# Patient Record
Sex: MALE | Race: BLACK OR AFRICAN AMERICAN | URBAN - METROPOLITAN AREA
[De-identification: names, ages, dates, MRNs, and addresses within clinical notes are randomized per-mention and may not be internally consistent; named-entity substitution may affect disease eponyms.]

---

## 2019-01-08 ENCOUNTER — EMERGENCY (EMERGENCY)
Facility: HOSPITAL | Age: 29
LOS: 1 days | Discharge: HOME | End: 2019-01-08
Attending: EMERGENCY MEDICINE | Admitting: EMERGENCY MEDICINE
Payer: SELF-PAY

## 2019-01-08 VITALS
HEART RATE: 64 BPM | SYSTOLIC BLOOD PRESSURE: 148 MMHG | RESPIRATION RATE: 18 BRPM | OXYGEN SATURATION: 100 % | TEMPERATURE: 99 F | DIASTOLIC BLOOD PRESSURE: 94 MMHG

## 2019-01-08 VITALS
HEART RATE: 59 BPM | RESPIRATION RATE: 18 BRPM | SYSTOLIC BLOOD PRESSURE: 144 MMHG | TEMPERATURE: 99 F | OXYGEN SATURATION: 99 % | WEIGHT: 195.11 LBS | DIASTOLIC BLOOD PRESSURE: 101 MMHG
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
## 2019-01-08 DIAGNOSIS — M25.512 PAIN IN LEFT SHOULDER: ICD-10-CM

## 2019-01-08 DIAGNOSIS — S40.012A CONTUSION OF LEFT SHOULDER, INITIAL ENCOUNTER: ICD-10-CM

## 2019-01-08 PROCEDURE — 99283 EMERGENCY DEPT VISIT LOW MDM: CPT

## 2019-01-08 PROCEDURE — 73030 X-RAY EXAM OF SHOULDER: CPT

## 2019-01-08 PROCEDURE — 73030 X-RAY EXAM OF SHOULDER: CPT | Mod: 26

## 2019-01-08 PROCEDURE — 73020 X-RAY EXAM OF SHOULDER: CPT

## 2019-01-08 PROCEDURE — 73020 X-RAY EXAM OF SHOULDER: CPT | Mod: 26,LT

## 2019-01-08 RX ORDER — OXYCODONE AND ACETAMINOPHEN 5; 325 MG/1; MG/1
1 TABLET ORAL ONCE
Qty: 0 | Refills: 0 | Status: DISCONTINUED | OUTPATIENT
Start: 2019-01-08 | End: 2019-01-08

## 2019-01-08 RX ADMIN — OXYCODONE AND ACETAMINOPHEN 1 TABLET(S): 5; 325 TABLET ORAL at 14:39

## 2019-01-08 NOTE — ED PROVIDER NOTE - CARE PROVIDER_API CALL
Jose Ramon Tee), Orthopaedic Surgery  [address] [address]  Phone: [phone]  Fax: [phone]

## 2019-01-08 NOTE — ED PROVIDER NOTE - MEDICAL DECISION MAKING DETAILS
Pt with left shoulder pain s/p door hitting shoulder, PE - bruise to anterior shoulder but able to range and touch opposite shoulder with some pain, no neurovascular deficit on exam, xrays reviewed with Dr. Jamison who state no dislocation at this time.  Recommend RICE, pt placed in sling and pt to follow up with ortho.

## 2019-01-08 NOTE — ED PROVIDER NOTE - MUSCULOSKELETAL, MLM
left upper ext - (+) bruise to anterior shoulder, with LROM at shoulder secondary to pain, appears deformed, FROM at elbow and wrist, no distal nv deficit, lateral sensation intact

## 2019-01-08 NOTE — ED ADULT NURSE NOTE - OBJECTIVE STATEMENT
Pt presents to ED with c/o L shoulder injury sustained when someone at work opened a door hitting his L shoulder. Bruising noted, distal pulses present, pain worse when lifting arm.

## 2019-01-08 NOTE — ED ADULT NURSE NOTE - NSIMPLEMENTINTERV_GEN_ALL_ED
Implemented All Universal Safety Interventions:  [place] to call system. Call bell, personal items and telephone within reach. Instruct patient to call for assistance. Room bathroom lighting operational. Non-slip footwear when patient is off stretcher. Physically safe environment: no spills, clutter or unnecessary equipment. Stretcher in lowest position, wheels locked, appropriate side rails in place.

## 2019-01-08 NOTE — ED PROVIDER NOTE - NSFOLLOWUPINSTRUCTIONS_ED_ALL_ED_FT
Take ibuprofen and percocet as prescribed.    Follow up with orthopedist recommended below.    Shoulder Sprain  A shoulder sprain is a partial or complete tear in one of the tough, fiber-like tissues (ligaments) in the shoulder. The ligaments in the shoulder help to hold the shoulder in place.    What are the causes?  This condition may be caused by:    A fall.  A hit to the shoulder.  A twist of the arm.    What increases the risk?  This condition is more likely to develop in:    People who play sports.  People who have problems with balance or coordination.    What are the signs or symptoms?  Symptoms of this condition include:    Pain when moving the shoulder.  Limited ability to move the shoulder.  Swelling and tenderness on top of the shoulder.  Warmth in the shoulder.  A change in the shape of the shoulder.  Redness or bruising on the shoulder.    How is this diagnosed?  This condition is diagnosed with a physical exam. During the exam, you may be asked to do simple exercises with your shoulder. You may also have imaging tests, such as X-rays, MRI, or a CT scan. These tests can show how severe the sprain is.    How is this treated?  This condition may be treated with:    Rest.  Pain medicine.  Ice.  A sling or brace. This is used to keep the arm still while the shoulder is healing.  Physical therapy or rehabilitation exercises. These help to improve the range of motion and strength of the shoulder.  Surgery (rare). Surgery may be needed if the sprain caused a joint to become unstable. Surgery may also be needed to reduce pain.    Some people may develop ongoing shoulder pain or lose some range of motion in the shoulder. However, most people do not develop long-term problems.    Follow these instructions at home:  ImageRest.  Ask your health care provider when it is safe for you to drive if you have a sling or brace on your shoulder.  Take over-the-counter and prescription medicines only as told by your health care provider.  If directed, apply ice to the area:    Put ice in a plastic bag.  Place a towel between your skin and the bag.  Leave the ice on for 20 minutes, 2–3 times per day.    If you were given a shoulder sling or brace:    Wear it as told.  Remove it to shower or bathe.  Move your arm only as much as told by your health care provider, but keep your hand moving to prevent swelling.    If you were shown how to do any exercises, do them as told by your health care provider.  Keep all follow-up visits as told by your health care provider. This is important.  Contact a health care provider if:  Your pain gets worse.  Your pain is not relieved with medicines.  You have increased redness or swelling.  Get help right away if:  You have a fever.  You cannot move your arm or shoulder.  You develop severe numbness or tingling in your arm, hand, or fingers.  Your arm, hand, or fingers turn blue, white, or gray and feel cold.  This information is not intended to replace advice given to you by your health care provider. Make sure you discuss any questions you have with your health care provider.

## 2019-01-08 NOTE — ED ADULT TRIAGE NOTE - CHIEF COMPLAINT QUOTE
Patient c/o left shoulder pain s/p injury. Per patient, a coworker opened a door with force, hitting his left arm. Pt states pain with movement of left arm. Contusion noted to left shoulder.

## 2019-01-08 NOTE — ED PROVIDER NOTE - OBJECTIVE STATEMENT
27 y/o m with no sig pmh presents with left shoulder pain after metal door slammed on it several hours prior to presentation.  Pt with bruise to shoulder and complains of pain radiating down arm.  Denies extremity numbness or change in temperature.  Denies other injury.

## 2019-01-09 ENCOUNTER — OTHER (OUTPATIENT)
Age: 29
End: 2019-01-09

## 2019-01-09 PROBLEM — Z00.00 ENCOUNTER FOR PREVENTIVE HEALTH EXAMINATION: Status: ACTIVE | Noted: 2019-01-09

## 2019-01-11 ENCOUNTER — APPOINTMENT (OUTPATIENT)
Dept: ORTHOPEDIC SURGERY | Facility: CLINIC | Age: 29
End: 2019-01-11
Payer: OTHER MISCELLANEOUS

## 2019-01-11 VITALS
WEIGHT: 195 LBS | HEIGHT: 72 IN | DIASTOLIC BLOOD PRESSURE: 103 MMHG | HEART RATE: 75 BPM | BODY MASS INDEX: 26.41 KG/M2 | SYSTOLIC BLOOD PRESSURE: 159 MMHG

## 2019-01-11 DIAGNOSIS — Z78.9 OTHER SPECIFIED HEALTH STATUS: ICD-10-CM

## 2019-01-11 PROCEDURE — 99203 OFFICE O/P NEW LOW 30 MIN: CPT

## 2019-01-11 RX ORDER — IBUPROFEN 200 MG/1
TABLET, FILM COATED ORAL
Refills: 0 | Status: ACTIVE | COMMUNITY

## 2019-01-11 NOTE — HISTORY OF PRESENT ILLNESS
[de-identified] : Mr. Dooley is a 28-year-old right-hand dominant gentleman who works as an  who comes in with an injury to his left  shoulder that occurred at work on 1/8/19.\par Another person opened an elevator door which he was standing in front of and the door hit him hard on the left anterior shoulder and he had a lot of pain.\par He has pain and difficulty lifting his arm. Pain is localized to the anterior shoulder, 8/10 with burning and shooting pain. It helped somewhat medication but he still has pain. Symptoms are worse with certain motion. He has not had any treatment but did go to the emergency room the day of the injury and had x-rays which were negative.\par He is taking Motrin 3-4 about 3X/day and Oxycodone 2/day.\par He feels burning in the anterior shoulder.\par No prior shoulder injuries.\par He has a sling.\par He feels more comfortable with his arm down.

## 2019-01-11 NOTE — ASSESSMENT
[FreeTextEntry1] : 29 yo with a left shoulder contusion.The injury occurred about 4 days ago at work. He is having a lot of pain, more than I would expect given the findings. I would like for him to get an MRI to rule out an a.c. separation, grade 1 or any tear of the subscapularis or bone bruising/occult fracture.\par He can finish up taking the Percocet and I would recommend taking ibuprofen/Motrin 600 mg, 3 200 mg tablets up to 3 times a day with meals and he can take Tylenol 2 tablets twice a day in addition. Heat and ice. Sling as needed.\par I will call him when I get the MRI results.\par He should have a followup appointment in 2 weeks to reevaluate.\par He has 100% temporary impairment from the time of injury but we'll go back to work light duty on Monday, January 14, 2019. He states that he could work light duty and not being his LEFT arm for any real physical activities.\par

## 2019-01-11 NOTE — PHYSICAL EXAM
[Normal RUE] : Right Upper Extremity: No scars, rashes, lesions, ulcers, skin intact [Normal LUE] : Left Upper Extremity: No scars, rashes, lesions, ulcers, skin intact [Normal Touch] : sensation intact for touch [Normal] : Oriented to person, place, and time, insight and judgement were intact and the affect was normal [de-identified] : Bilateral shoulders:\par Cervical spine is without tenderness and ROM is within normal limits and without pain.\par Normal appearance. \par A step-off at the a.c. joint but there is tender LEFT anterior a.c. joint compared to the RIGHT which is nontender.\par There is also tenderness in the anterior glenohumeral joint region.\par No edema, ecchymoses, erythema. Skin is intact.\par AROM: 90 FE LEFT versus 180° on the RIGHT, IR to T 9 RIGHT versus L2 on the LEFT, 180 abd RIGHT versus about 60° on the LEFT with pain.\par PROM: 140 FE, 70 ER at the side RIGHT versus 80 on the LEFT\par Motor:  5-/5  supraspinatus with pain on the LEFT,  5/5 ER, 5/5 IR, 5/5 biceps, 5/5 deltoid. Pain with strength testing LEFT but not RIGHT.\par + Neer, + Curry.  Pain with all movement and provocative testing\par Sensation is intact distally in the UE.\par Skin is intact in the UE. \par Intact Motor distally.\par  [de-identified] : no respiratory istress [de-identified] : \par I reviewed the x-rays of the LEFT shoulder from the emergency room including one axillary view and an AP and lateral views.\par report:\par PROCEDURE DATE: 01/08/2019 \par \par INTERPRETATION: Indication: left shoulder pain reduced range of motion \par 5 views of the left shoulder have been submitted. Glenohumeral alignment is \par intact on the submitted images. The AC joint is preserved. No fracture is \par identified. \par \par Impression: No dislocation or acute fracture is identified.

## 2019-02-11 ENCOUNTER — APPOINTMENT (OUTPATIENT)
Dept: ORTHOPEDIC SURGERY | Facility: CLINIC | Age: 29
End: 2019-02-11
Payer: OTHER MISCELLANEOUS

## 2019-02-11 DIAGNOSIS — M25.512 PAIN IN LEFT SHOULDER: ICD-10-CM

## 2019-02-11 DIAGNOSIS — S43.431A SUPERIOR GLENOID LABRUM LESION OF RIGHT SHOULDER, INITIAL ENCOUNTER: ICD-10-CM

## 2019-02-11 DIAGNOSIS — S40.012D CONTUSION OF LEFT SHOULDER, SUBSEQUENT ENCOUNTER: ICD-10-CM

## 2019-02-11 PROCEDURE — 99214 OFFICE O/P EST MOD 30 MIN: CPT

## 2019-02-11 RX ORDER — OXYCODONE AND ACETAMINOPHEN 5; 325 MG/1; MG/1
5-325 TABLET ORAL
Refills: 0 | Status: COMPLETED | COMMUNITY
End: 2019-01-24

## 2019-02-11 NOTE — PHYSICAL EXAM
[UE] : Sensory: Intact in bilateral upper extremities [Rad] : radial 2+ and symmetric bilaterally [Normal RUE] : Right Upper Extremity: No scars, rashes, lesions, ulcers, skin intact [Normal LUE] : Left Upper Extremity: No scars, rashes, lesions, ulcers, skin intact [de-identified] : LEFT shoulder:\par Cervical spine is without tenderness and ROM is within normal limits and without pain.\par Normal appearance shoulder \par AROM: 180 FE, IR to T 10 versus T9 , 180 abd.\par PROM: 180 FE, 70 ER at the side, 90 ER and next C5 IR in the 90 degree abducted position.\par Motor:  5/5  supraspinatus,  5/5 ER, 5/5 IR, 5/5 biceps, 5/5 deltoid.  Normal lift off test\par - Neer, - [place]inette. -  X-arm.   + [place]'s, - Speeds.\par Tender mildly over the biceps tendon and the anterior shoulder.  \par Laxity: normal.\par No scapular winging.\par Sensation is intact distally in the UE.\par Skin is intact in the UE. \par Intact Motor distally.\par  [de-identified] : \par Reviewed the MRI of the shoulder. The CD froze frequently but I was able to see enough of the images including axillary, coronal and sagittal imaging and I agree that there may be a anterior labral tear at about the level of the equator but not inferior. There is not an obvious superior labral tear but could be some undermining of the superior labrum. No rotator cuff tears. No bone marrow edema. No evidence of any Hill-Sachs or Bankart lesions

## 2019-02-11 NOTE — HISTORY OF PRESENT ILLNESS
[de-identified] : Mr. Dooley comes in about 5 weeks following an injury to his LEFT shoulder when it was hit by a door at work.\par He had the MRI of the shoulder done on January 15, 2019 because of the severe pain he was having and it showed mild tendinosis in the supraspinatus without any tearing of the rotator cuff and a possible small tear at the mid anterior labrum.\par He states that he gets intermittent pain still in the shoulder. It has improved somewhat. He hasn't had any treatment yet except for the rest and took ibuprofen. The ibuprofen doesn't seem to make a difference so he has stopped taking it for the most part. He gets occasional pain moving his arm certain directions. Pain is in the front of the shoulder when he feels it.

## 2019-02-11 NOTE — ASSESSMENT
[FreeTextEntry1] : 28-year-old who had a contusion to his LEFT shoulder about 5 weeks ago.\par There appears to be a small anterior labral detachment but does not extend inferiorly to expect any instability of the shoulder. There is no way of knowing if this is acute or chronic. I question whether there is any undermining of the superior labrum as well.\par In any case I would recommend starting with nonoperative treatment working on strengthening around the shoulder and motion and seeing if his pain resolves. He can take the ibuprofen as needed. Heat and ice as needed. 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 Ibuprofen as needed.\par There is temporary impairment to the LEFT shoulder of 25%.\par Followup in 5-6 weeks

## 2019-02-11 NOTE — REASON FOR VISIT
[Follow-Up Visit] : a follow-up visit for [Shoulder Injury] : Shoulder Injury [Worker's Compensation] : This visit is related to worker's compensation

## 2019-03-14 ENCOUNTER — EMERGENCY (EMERGENCY)
Facility: HOSPITAL | Age: 29
LOS: 1 days | Discharge: HOME | End: 2019-03-14
Admitting: EMERGENCY MEDICINE
Payer: COMMERCIAL

## 2019-03-14 VITALS
HEART RATE: 64 BPM | OXYGEN SATURATION: 99 % | RESPIRATION RATE: 16 BRPM | DIASTOLIC BLOOD PRESSURE: 92 MMHG | WEIGHT: 203.71 LBS | TEMPERATURE: 99 F | HEIGHT: 72 IN | SYSTOLIC BLOOD PRESSURE: 142 MMHG

## 2019-03-14 LAB
FLU A RESULT: SIGNIFICANT CHANGE UP
FLU A RESULT: SIGNIFICANT CHANGE UP
FLUAV AG NPH QL: SIGNIFICANT CHANGE UP
FLUBV AG NPH QL: SIGNIFICANT CHANGE UP
RSV RESULT: SIGNIFICANT CHANGE UP
RSV RNA RESP QL NAA+PROBE: SIGNIFICANT CHANGE UP

## 2019-03-14 PROCEDURE — 94640 AIRWAY INHALATION TREATMENT: CPT

## 2019-03-14 PROCEDURE — 99283 EMERGENCY DEPT VISIT LOW MDM: CPT

## 2019-03-14 PROCEDURE — 99283 EMERGENCY DEPT VISIT LOW MDM: CPT | Mod: 25

## 2019-03-14 PROCEDURE — 87631 RESP VIRUS 3-5 TARGETS: CPT

## 2019-03-14 PROCEDURE — 71046 X-RAY EXAM CHEST 2 VIEWS: CPT

## 2019-03-14 PROCEDURE — 71046 X-RAY EXAM CHEST 2 VIEWS: CPT | Mod: 26

## 2019-03-14 RX ORDER — PSEUDOEPHEDRINE HCL 30 MG
30 TABLET ORAL ONCE
Qty: 0 | Refills: 0 | Status: COMPLETED | OUTPATIENT
Start: 2019-03-14 | End: 2019-03-14

## 2019-03-14 RX ORDER — BROMPHENIRAMINE MALEATE, PSEUDOEPHEDRINE HYDROCHLORIDE, AND DEXTROMETHORPHAN HYDROBROMIDE 2; 10; 30 MG/5ML; MG/5ML; MG/5ML
10 SOLUTION ORAL
Qty: 210 | Refills: 0 | OUTPATIENT
Start: 2019-03-14 | End: 2019-03-20

## 2019-03-14 RX ORDER — AZITHROMYCIN 500 MG/1
1 TABLET, FILM COATED ORAL
Qty: 5 | Refills: 0 | OUTPATIENT
Start: 2019-03-14 | End: 2019-03-18

## 2019-03-14 RX ORDER — IBUPROFEN 200 MG
600 TABLET ORAL ONCE
Qty: 0 | Refills: 0 | Status: COMPLETED | OUTPATIENT
Start: 2019-03-14 | End: 2019-03-14

## 2019-03-14 RX ORDER — IPRATROPIUM/ALBUTEROL SULFATE 18-103MCG
3 AEROSOL WITH ADAPTER (GRAM) INHALATION ONCE
Qty: 0 | Refills: 0 | Status: COMPLETED | OUTPATIENT
Start: 2019-03-14 | End: 2019-03-14

## 2019-03-14 RX ADMIN — Medication 30 MILLIGRAM(S): at 14:53

## 2019-03-14 NOTE — ED PROVIDER NOTE - NSFOLLOWUPCLINICS_GEN_ALL_ED_FT
[facility]  Family Medicine  [facility]. [address]  [place], [place] [facility]  Phone: [phone]  Fax:   Follow Up Time:

## 2019-03-14 NOTE — ED PROVIDER NOTE - OBJECTIVE STATEMENT
29 yo male in the ER c/o persistent cough x 1 month, c/o nasal congestion, chest wall soreness. Pt reports he was taking multiple OTC medications, but is no improvement noted. Pt mentioned that he has noght sweats, generalized weakness and his cough became productive for the past 5 days.

## 2019-03-14 NOTE — ED PROVIDER NOTE - CLINICAL SUMMARY MEDICAL DECISION MAKING FREE TEXT BOX
27 yo male in the ER due to persistent productive cough x 1 month, c/o generalized weakness, decreased appetite, body aches and chills recently. Afebrile, in NAD. suspect viral etiology. pending RVP. plan Supportive care, anticipate d/c home for further treatment .

## 2019-03-14 NOTE — ED ADULT NURSE REASSESSMENT NOTE - NS ED NURSE REASSESS COMMENT FT1
PT currently refuses Duoneb and ibuprofen.  PT states "I got this medication at [facility] and they didn't do anything for me". "I just want to know what's wrong with me and I want something else that can make me feel better".  PT advised the medications are commonly prescribed for his symptoms. Pt continues to refuse medications. Provider made aware. PT currently refuses Duoneb and ibuprofen.  PT states "I got this medication at [facility] and they didn't do anything for me". "I just want to know what's wrong with me and I want something else that can make me feel better".  PT advised the medications are commonly prescribed to treat his symptoms. Pt continues to refuse medications. Provider made aware.  Alert and oriented x3.

## 2019-03-14 NOTE — ED ADULT TRIAGE NOTE - CHIEF COMPLAINT QUOTE
"I have been sick for about 4 weeks with colds on and off and have been seen at the doctors but I have not been really given anything and the OTC medications are not working".

## 2019-03-14 NOTE — ED ADULT NURSE NOTE - NSIMPLEMENTINTERV_GEN_ALL_ED
Implemented All Universal Safety Interventions:  [place] to call system. Call bell, personal items and telephone within reach. Instruct patient to call for assistance. Room bathroom lighting operational. Non-slip footwear when patient is off stretcher. Physically safe environment: no spills, clutter or unnecessary equipment. Stretcher in lowest position, wheels locked, appropriate side rails in place.

## 2019-03-14 NOTE — ED PROVIDER NOTE - NSFOLLOWUPINSTRUCTIONS_ED_ALL_ED_FT
I have discussed the discharge plan with the patient. The patient agrees with the plan, as discussed.  The patient understands Emergency Department diagnosis is a preliminary diagnosis often based on limited information and that the patient must adhere to the follow-up plan as discussed.  The patient understands that if the symptoms worsen or if prescribed medications do not have the desired/planned effect that the patient may return to the Emergency Department at any time for further evaluation and treatment.    Log Out.    [employer]anda® CareNotes®     :  [facility]             UPPER RESPIRATORY INFECTION - General Information     Upper Respiratory Infection    WHAT YOU NEED TO KNOW:    What is an upper respiratory infection? An upper respiratory infection is also called a common cold. It can affect your nose, throat, ears, and sinuses.     What causes a cold? The common cold is caused by a virus. There are many different cold viruses, and each is contagious. This means the virus can be easily spread to another person when the sick person coughs or sneezes. The virus can also be spread if you touch something that a person with a cold has touched. You are more likely to get a cold in the winter. Your risk of getting a cold may be increased if you smoke cigarettes or have allergies, such as hay fever.    What are the signs and symptoms of a cold? Cold symptoms are usually worst for the first 3 to 5 days. You may have any of the following:    Runny or stuffy nose      Sneezing and coughing      Sore throat or hoarseness      Red, watery, and sore eyes      Fatigue       Chills and fever      Headache, body aches, or sore muscles    How is a cold treated? There is no cure for the common cold. Colds are caused by viruses and do not get better with antibiotics. Most people get better in 7 to 14 days. You may continue to cough for 2 to 3 weeks. The following may help decrease your symptoms:    Decongestants help reduce nasal congestion and help you breathe more easily. If you take decongestant pills, they may make you feel restless or cause problems with your sleep. Do not use decongestant sprays for more than a few days.       Cough suppressants help reduce coughing. Ask your healthcare provider which type of cough medicine is best for you.       NSAIDs, such as ibuprofen, help decrease swelling, pain, and fever. NSAIDs can cause stomach bleeding or kidney problems in certain people. If you take blood thinner medicine, always ask your healthcare provider if NSAIDs are safe for you. Always read the medicine label and follow directions.      Acetaminophen decreases pain and fever. It is available without a doctor's order. Ask how much to take and how often to take it. Follow directions. Read the labels of all other medicines you are using to see if they also contain acetaminophen, or ask your doctor or pharmacist. Acetaminophen can cause liver damage if not taken correctly. Do not use more than 4 grams (4,000 milligrams) total of acetaminophen in one day.     How can I manage my cold?     Rest as much as possible. Slowly start to do more each day.       Drink more liquids as directed. Liquids will help thin and loosen mucus so you can cough it up. Liquids will also help prevent dehydration. Liquids that help prevent dehydration include water, fruit juice, and broth. Do not drink liquids that contain caffeine. Caffeine can increase your risk for dehydration. Ask your healthcare provider how much liquid to drink each day.       Soothe a sore throat. Gargle with warm salt water. This helps your sore throat feel better. Make salt water by dissolving ¼ teaspoon salt in 1 cup warm water. You may also suck on hard candy or throat lozenges. You may use a sore throat spray.      Use a humidifier or vaporizer. Use a cool mist humidifier or a vaporizer to increase air moisture in your home. This may make it easier for you to breathe and help decrease your cough.       Use saline nasal drops as directed. These help relieve congestion.       Apply petroleum-based jelly around the outside of your nostrils. This can decrease irritation from blowing your nose.       Do not smoke. Nicotine and other chemicals in cigarettes and cigars can make your symptoms worse. They can also cause infections such as bronchitis or pneumonia. Ask your healthcare provider for information if you currently smoke and need help to quit. E-cigarettes or smokeless tobacco still contain nicotine. Talk to your healthcare provider before you use these products.     What can I do to prevent the spread of the common cold?     Try to stay away from other people during the first 2 to 3 days of your cold when it is more easily spread.       Do not share food or drinks.       Do not share hand towels with household members.      Wash your hands often, especially after you blow your nose. Turn away from other people and cover your mouth and nose with a tissue when you sneeze or cough.Handwashing         When should I seek immediate care?     You have chest pain or trouble breathing.        When should I contact my healthcare provider?     You have a fever over 102ºF (39ºC).      Your sore throat gets worse or you see white or yellow spots in your throat.      Your symptoms get worse after 3 to 5 days or your cold is not better in 14 days.      You have a rash anywhere on your skin.      You have large, tender lumps in your neck.      You have thick, green, or yellow drainage from your nose.      You cough up thick yellow, green, or bloody mucus.      You are vomiting for more than 24 hours and cannot keep fluids down.      You have a bad earache.      You have questions or concerns about your condition or care.    CARE AGREEMENT:    You have the right to help plan your care. Learn about your health condition and how it may be treated. Discuss treatment options with your healthcare providers to decide what care you want to receive. You always have the right to refuse treatment.

## 2019-03-18 DIAGNOSIS — J06.9 ACUTE UPPER RESPIRATORY INFECTION, UNSPECIFIED: ICD-10-CM

## 2019-03-18 DIAGNOSIS — Z79.891 LONG TERM (CURRENT) USE OF OPIATE ANALGESIC: ICD-10-CM

## 2019-03-18 DIAGNOSIS — R05 COUGH: ICD-10-CM

## 2019-03-18 DIAGNOSIS — J40 BRONCHITIS, NOT SPECIFIED AS ACUTE OR CHRONIC: ICD-10-CM

## 2020-12-01 NOTE — ED PROVIDER NOTE - CROS ED CONS ALL NEG
Dear Ed Pablo,    Per your recent telephone screening with Employee Health:    · You were informed of your positive PCR test result.  · You are to remain off work and isolate yourself from  public places except to seek medical care  · Complete the symptom tracker daily on [employer] to comply with your actively monitoring status    YOU MAY NOT RETURN TO WORK WITHOUT CLEARANCE FROM EMPLOYEE HEALTH.  It is not necessary to call Employee Health.  The Central COVID Team will contact you at the appropriate interval for clearance.    Follow up date 12/8/20      Notify Employee Health if you have a marked increase  in symptoms while you are off.[phone] or email [email or web address].    For [place] Employees, please follow this link to view a copy of the consent form:  [place] Employee Consent Form     For [place] Employees, please follow this link to view a copy of the consent form:   [place] Employee Consent Form     Thank you,    Employee Health Central COVID Team    [phone]    Hours: M-F [phone] Saturday - please answer your phone if an outside line is calling, regardless of hours we work 7days a week and will follow up as appropriate.     [email or web address]    Cc: Manager  
negative...

## 2024-05-24 NOTE — ED PROVIDER NOTE - CONSTITUTIONAL NEGATIVE STATEMENT, MLM
[Well Developed] : well developed [Well Nourished] : well nourished [No Acute Distress] : no acute distress [Normal Venous Pressure] : normal venous pressure [Normal S1, S2] : normal S1, S2 [No Murmur] : no murmur [Clear Lung Fields] : clear lung fields [Good Air Entry] : good air entry [No Respiratory Distress] : no respiratory distress  [Soft] : abdomen soft [Non Tender] : non-tender [Normal Gait] : normal gait [No Edema] : no edema [No Focal Deficits] : no focal deficits [Alert and Oriented] : alert and oriented no fever and no chills.